# Patient Record
Sex: MALE | Race: WHITE | NOT HISPANIC OR LATINO | URBAN - METROPOLITAN AREA
[De-identification: names, ages, dates, MRNs, and addresses within clinical notes are randomized per-mention and may not be internally consistent; named-entity substitution may affect disease eponyms.]

---

## 2021-08-01 ENCOUNTER — EMERGENCY (EMERGENCY)
Facility: HOSPITAL | Age: 31
LOS: 1 days | Discharge: HOME | End: 2021-08-01
Attending: STUDENT IN AN ORGANIZED HEALTH CARE EDUCATION/TRAINING PROGRAM | Admitting: STUDENT IN AN ORGANIZED HEALTH CARE EDUCATION/TRAINING PROGRAM
Payer: COMMERCIAL

## 2021-08-01 VITALS
SYSTOLIC BLOOD PRESSURE: 145 MMHG | HEIGHT: 68 IN | RESPIRATION RATE: 17 BRPM | OXYGEN SATURATION: 95 % | DIASTOLIC BLOOD PRESSURE: 90 MMHG | WEIGHT: 190.04 LBS | HEART RATE: 99 BPM | TEMPERATURE: 98 F

## 2021-08-01 PROCEDURE — 99284 EMERGENCY DEPT VISIT MOD MDM: CPT

## 2021-08-01 PROCEDURE — 99283 EMERGENCY DEPT VISIT LOW MDM: CPT | Mod: 25

## 2021-08-01 PROCEDURE — 70360 X-RAY EXAM OF NECK: CPT | Mod: 26

## 2021-08-01 PROCEDURE — 70360 X-RAY EXAM OF NECK: CPT

## 2021-08-01 RX ORDER — DEXAMETHASONE 0.5 MG/5ML
10 ELIXIR ORAL ONCE
Refills: 0 | Status: COMPLETED | OUTPATIENT
Start: 2021-08-01 | End: 2021-08-01

## 2021-08-01 RX ADMIN — Medication 10 MILLIGRAM(S): at 18:53

## 2021-08-01 NOTE — ED PROVIDER NOTE - ATTENDING CONTRIBUTION TO CARE
Dr. Rene ENG: I performed a face to face bedside interview with patient regarding history of present illness, review of symptoms and past medical history. I completed an independent physical exam.  I have discussed patient's plan of care with PA.   I agree with note as stated above, having amended the EMR as needed to reflect my findings.   This includes HISTORY OF PRESENT ILLNESS, HIV, PAST MEDICAL/SURGICAL/FAMILY/SOCIAL HISTORY, ALLERGIES AND HOME MEDICATIONS, REVIEW OF SYSTEMS, PHYSICAL EXAM, and any PROGRESS NOTES during the time I functioned as the attending physician for this patient.     presenting with sore throat x 1 day, no muffled voice, no fevers, no vomiting, no drooling, tolerating secretions, no neck pain, no trauma, no surgical hx. denies chest pain, shortness of breath, abdominal pain, fb sensation.  VITAL SIGNS: I have reviewed nursing notes and confirm.   GEN: Well-developed; well-nourished; in no acute distress. Speaking full sentences.  SKIN: Warm, pink, no rash, no diaphoresis, no cyanosis, well perfused.   HEAD: Normocephalic; atraumatic. No scalp lacerations, no abrasions.  NECK: Supple; non tender.   EYES: Pupils 3mm equal, round, reactive to light and accomodation, conjunctiva and sclera clear. Extra-ocular movements intact bilaterally.  ENT: No nasal discharge; airway clear. Trachea is midline. NO STRIDOR. ORAL: No oropharyngeal exudates; mild posterior pharynx erythema but uvula midline, no tolsimllar abscess. Normal dentition.  CV: Regular rate and rhythm. S1, S2 normal; no murmurs, gallops, or rubs. No lower extremity pitting edema bilaterally. Capillary refill < 2 seconds throughout. Distal pulses intact 2+ throughout.  RESP: CTA bilaterally. No wheezes, rales, or rhonchi.   ABD: Normal bowel sounds, soft, non-distended, non-tender, no hepatosplenomegaly. No CVA tenderness bilaterally.  MSK: Normal range of motion and movement of all 4 extremities. No joint or muscular pain throughout. No clubbing.   BACK: No thoracolumbar midline or paravertebral tenderness. No step-offs or obvious deformities.  NEURO: Alert & oriented x 3, Grossly unremarkable. Sensory and motor intact throughout. No focal deficits. Gait: Fluid. Normal speech and coordination.   PSYCH: Cooperative, appropriate.   plan: laryngitis/pharyngitis, symptomatic care, decadron/otc cough drops/honey tea. f/u pmd, return precautions.

## 2021-08-01 NOTE — ED PROVIDER NOTE - OBJECTIVE STATEMENT
pt 30 yo m p/w sudden onset of feeling like there is mucous in his throat and hoarse voice. no actual pain. no drooling. able to eat and drink normally. no neck pain  denies dry cough, fever, chills, sore throat, diffuse body aches, rash, abd pain, diarrhea, n/v, headache, vision changes, SOB, CP, difficulty tolerating PO  no covid + contacts

## 2021-08-01 NOTE — ED PROVIDER NOTE - NSFOLLOWUPINSTRUCTIONS_ED_ALL_ED_FT
Laryngitis    WHAT YOU NEED TO KNOW:    What is laryngitis? Laryngitis is inflammation of your larynx (voice box). The larynx holds your vocal cords. Your vocal cords usually open and close easily to form sounds. With laryngitis, your vocal cords swell and become irritated. This may change how your voice sounds, or you may lose your voice for a short while.    What causes laryngitis?   •Overuse of voice from shouting, singing loudly, or speaking loudly      •Irritation from smoke, pollution, chemicals, or pollen      •Infection with a virus, bacteria, or fungus      •Certain medical conditions, such as acid reflux      What are the signs and symptoms of laryngitis?   •A weak voice or loss of voice      •Hoarse, raspy voice      •Sore, dry, raw throat      •Clearing your throat often      •Dry cough      How is laryngitis diagnosed? Your healthcare provider may ask about your symptoms and examine your throat. He or she may use a light and tiny mirror to see your vocal cords. Instead, a thin, flexible tube may be inserted through your mouth to the back of your throat. This is called a laryngoscopy. Sometimes, a small sample of tissue from the area is needed. This is called a biopsy. The tissue sample is sent to a lab for tests.    How is laryngitis treated? Laryngitis usually gets better on its own within 1 to 2 weeks. Medicines such as steroids or antibiotics may be used in some cases.    How can I manage my symptoms?   •Rest your voice. Try to talk as little as possible.      •Use a cool mist humidifier to increase air moisture in your home. This may soothe your throat and decrease your cough.      •Keep your mouth and throat moist. Suck on a throat lozenge or chew sugarless gum.      •Do not smoke, and avoid secondhand smoke. Nicotine and other chemicals in cigarettes and cigars can cause dryness and irritation in your throat and vocal cords. Ask your healthcare provider for information if you currently smoke and need help to quit. E-cigarettes or smokeless tobacco still contain nicotine. Talk to your healthcare provider before you use these products.      •Drink liquids as directed. You may need to drink extra liquids to help soothe your throat. Water or warm tea are good liquids to drink.      •Avoid spicy and acidic foods. These may irritate your throat. Examples include citrus, salad dressings, and hot sauces. Carbonated drinks may also cause discomfort in your throat.      •Try not to clear your throat. This can cause more irritation and swelling of your vocal cords.      Call your local emergency number (911 in the [place]) if:   •You have sudden trouble breathing.      •You have severe drooling or trouble swallowing.      When should I seek immediate care?   •You cough up blood.      •You have severe pain.      When should I call my doctor?   •Your symptoms last longer than 2 weeks.      •You have questions or concerns about your condition or care.      CARE AGREEMENT:    You have the right to help plan your care. Learn about your health condition and how it may be treated. Discuss treatment options with your healthcare providers to decide what care you want to receive. You always have the right to refuse treatment.

## 2021-08-01 NOTE — ED PROVIDER NOTE - CARE PROVIDER_API CALL
Kulwant Solis)  Otolaryngology  [address] [place] [address]  Phone: [phone]  Fax: [phone]  Follow Up Time:

## 2021-08-01 NOTE — ED PROVIDER NOTE - CLINICAL SUMMARY MEDICAL DECISION MAKING FREE TEXT BOX
pt 32 yo m p/w sudden onset of feeling like there is mucous in his throat and hoarse voice. no actual pain. no drooling. able to eat and drink normally. no neck pain  denies dry cough, fever, chills, sore throat, diffuse body aches, rash, abd pain, diarrhea, n/v, headache, vision changes, SOB, CP, difficulty tolerating PO  no covid + contacts  decadron, xray neg, fu pcp/ent

## 2021-08-01 NOTE — ED PROVIDER NOTE - PHYSICAL EXAMINATION
General:     NAD, well-nourished, well-appearing  Eyes: PERRL  Head:     NC/AT, EOMI, oral mucosa moist, pharynx clear no swelling or vesicles  Neck:     trachea midline, no tenderness, FROM, no meningismus   Lungs:     CTA b/l  CVS:     RRR  Abd:     +BS, s/nt/nd  Ext:   no deformities   Neuro: AAOx3

## 2021-08-01 NOTE — ED ADULT NURSE NOTE - NSIMPLEMENTINTERV_GEN_ALL_ED
Implemented All Universal Safety Interventions:  [place] to call system. Call bell, personal items and telephone within reach. Instruct patient to call for assistance. Room bathroom lighting operational. Non-slip footwear when patient is off stretcher. Physically safe environment: no spills, clutter or unnecessary equipment. Stretcher in lowest position, wheels locked, appropriate side rails in place.

## 2021-08-01 NOTE — ED PROVIDER NOTE - PATIENT PORTAL LINK FT
You can access the FollowMyHealth Patient Portal offered by [facility] by registering at the following website: [email or web address]. By joining [employer]’s FollowMyHealth portal, you will also be able to view your health information using other applications (apps) compatible with our system.

## 2022-08-20 ENCOUNTER — EMERGENCY (EMERGENCY)
Facility: HOSPITAL | Age: 32
LOS: 1 days | Discharge: HOME | End: 2022-08-20
Attending: EMERGENCY MEDICINE | Admitting: EMERGENCY MEDICINE
Payer: COMMERCIAL

## 2022-08-20 VITALS
HEART RATE: 98 BPM | TEMPERATURE: 98 F | OXYGEN SATURATION: 95 % | SYSTOLIC BLOOD PRESSURE: 126 MMHG | HEIGHT: 68 IN | RESPIRATION RATE: 16 BRPM | DIASTOLIC BLOOD PRESSURE: 92 MMHG

## 2022-08-20 VITALS
SYSTOLIC BLOOD PRESSURE: 124 MMHG | RESPIRATION RATE: 17 BRPM | OXYGEN SATURATION: 98 % | DIASTOLIC BLOOD PRESSURE: 86 MMHG | HEART RATE: 79 BPM

## 2022-08-20 PROCEDURE — 73080 X-RAY EXAM OF ELBOW: CPT | Mod: 26,RT

## 2022-08-20 PROCEDURE — 99284 EMERGENCY DEPT VISIT MOD MDM: CPT

## 2022-08-20 PROCEDURE — 73080 X-RAY EXAM OF ELBOW: CPT

## 2022-08-20 PROCEDURE — 73090 X-RAY EXAM OF FOREARM: CPT | Mod: 26,LT

## 2022-08-20 PROCEDURE — 73090 X-RAY EXAM OF FOREARM: CPT

## 2022-08-20 RX ORDER — IBUPROFEN 200 MG
600 TABLET ORAL ONCE
Refills: 0 | Status: COMPLETED | OUTPATIENT
Start: 2022-08-20 | End: 2022-08-20

## 2022-08-20 RX ADMIN — Medication 600 MILLIGRAM(S): at 22:25

## 2022-08-20 NOTE — ED PROVIDER NOTE - OBJECTIVE STATEMENT
33 y/o M with no PMHx p/w c/o right elbow pain with inability to fully extend and flex 31 y/o M with no PMHx p/w c/o right elbow pain with inability to fully extend and flex s/p fall with arm tucked behind him.  Pt denies head injury or LOC.  No neck or back pain. Pt ambulatory into the ED and in NAD.  He has no CP or SOB.

## 2022-08-20 NOTE — ED PROVIDER NOTE - NSFOLLOWUPINSTRUCTIONS_ED_ALL_ED_FT
Patient Name: MISTY GRAHAM  Caregiver: Bodega Toney KIRSTEN  Elbow Sprain     Take Ibuprofen 400 mg every 6 to 8 hours for pain and swelling  An elbow sprain is an injury to one of the strong bands of tissue (ligaments) that connect the bones of the elbow. Ligaments connect the three bones that make up the elbow joint. This injury usually occurs on the inside of the elbow and rarely on the outside. There are three types of elbow sprains:    A grade 1 sprain is a stretching of a ligament. This injury causes minor pain and swelling, but the joint remains stable.  A grade 2 sprain is a partial ligament tear. This injury may cause moderate pain and swelling, and a looseness in the joint that causes it to move more than normal (laxity).  A grade 3 sprain is a complete ligament tear. This injury will cause severe pain and swelling, and the joint will become unstable.    What are the causes?  This condition may be caused by:    A sudden injury (acute sprain). This may result from falling on an outstretched arm or severely twisting or straining your elbow joint.  An overuse injury. This injury occurs gradually over time from doing activities that involve the same elbow movements over and over again. This type of injury is common in activities that require overhand throwing.    What are the signs or symptoms?  Symptoms of this condition include:    Pain.  Pain that gets worse with elbow movement.  Swelling.  Bruising.  Stiffness of the elbow joint.  Laxity of the elbow joint.  Inability to use the elbow joint.  Tingling or numbness.  Hearing a pop or feeling a tear at the time of the injury.    How is this diagnosed?  This condition may be diagnosed based on:    Your symptoms and history of an injury or activity that puts stress on the elbow.  A physical exam. The health care provider will check the movement and stability of your elbow.  Imaging tests, such as an X-ray or MRI. These tests can show how severe the sprain is and can be used to rule out a broken bone or stress fracture.    How is this treated?  At first, this condition may be treated by protecting, resting, icing, applying pressure (compression), and raising (elevating) the injured elbow above the level of your heart. This is known as PRICE therapy. Your health care provider may also recommend taking a nonsteroidal anti-inflammatory drug (NSAID) to reduce pain and swelling.    Additional treatment depends on the severity of the sprain.    A grade 1 sprain may need only PRICE therapy.  A grade 2 sprain may be treated with PRICE therapy and an elbow brace.  A grade 3 sprain usually requires surgery to repair the ligament.    You may also need to do certain exercises to maintain full movement and to strengthen the muscles of the shoulder, forearm, and elbow (physical therapy).    Follow these instructions at home:      If you have a brace:    Wear the brace as told by your health care provider. Remove it only as told by your health care provider.  Loosen the brace if your fingers tingle, become numb, or turn cold and blue.  Keep the brace clean.  If the brace is not waterproof:    Do not let it get wet.  Cover it with a watertight covering when you take a bath or shower.        Managing pain, stiffness, and swelling     If directed, put ice on your elbow:    Put ice in a plastic bag.  Place a towel between your skin and the bag.  Leave the ice on for 20 minutes, 2–3 times a day.  Move your fingers often to reduce stiffness and swelling.  Elevate your elbow above the level of your heart while you are sitting or lying down.        Activity    Avoid activities that cause elbow pain.   Return to your normal activities as told by your health care provider. Ask your health care provider what activities are safe for you.  Ask your health care provider when it is safe to drive if you have an elbow brace.  Do exercises as told by your health care provider.        General instructions    Wear an elastic bandage or compression wrap only as told by your health care provider.  Take over-the-counter and prescription medicines only as told by your health care provider.  Do not use any products that contain nicotine or tobacco, such as cigarettes, e-cigarettes, and chewing tobacco. These can delay healing. If you need help quitting, ask your health care provider.  Keep all follow-up visits as told by your health care provider. This is important.    Contact a health care provider if:  Your symptoms get worse.  You develop new symptoms.  Your symptoms have not improved with home care after two weeks.    Get help right away if:  You have severe pain.  Your fingers turn white, very red, or cold and blue.    Summary  An elbow sprain is an injury to a ligament in the elbow.  An elbow sprain can be from an acute injury or repetitive stress.  Symptoms include pain, swelling, loss of movement, and bruising.  At first, this condition may be treated by protecting, resting, icing, applying pressure (compression), and elevating the injured elbow above the level of your heart. This is known as PRICE therapy.   Other treatments depend on the severity of the sprain.    ADDITIONAL NOTES AND INSTRUCTIONS    Please follow up with your Primary MD in 24-48 hr.  Seek immediate medical care for any new/worsening signs or symptoms.

## 2022-08-20 NOTE — ED ADULT NURSE NOTE - OBJECTIVE STATEMENT
Pt came from home s/p fall c/o right arm pain/injury. Pt states that about 3 hrs ago he fell down 1 step and landed on his right arm. Pt states that he is unable to fully extend or flex his right arm. Pt states that the pain is 10/10. Pt denies any numbness or tingling in the right arm. No PMH.

## 2022-08-20 NOTE — ED ADULT NURSE NOTE - NSIMPLEMENTINTERV_GEN_ALL_ED
Implemented All Fall with Harm Risk Interventions:  [place] to call system. Call bell, personal items and telephone within reach. Instruct patient to call for assistance. Room bathroom lighting operational. Non-slip footwear when patient is off stretcher. Physically safe environment: no spills, clutter or unnecessary equipment. Stretcher in lowest position, wheels locked, appropriate side rails in place. Provide visual cue, wrist band, yellow gown, etc. Monitor gait and stability. Monitor for mental status changes and reorient to person, place, and time. Review medications for side effects contributing to fall risk. Reinforce activity limits and safety measures with patient and family. Provide visual clues: red socks.

## 2022-08-20 NOTE — ED PROVIDER NOTE - PATIENT PORTAL LINK FT
You can access the FollowMyHealth Patient Portal offered by [facility] by registering at the following website: [email or web address]. By joining [employer]’s FollowMyHealth portal, you will also be able to view your health information using other applications (apps) compatible with our system.

## 2022-08-21 PROBLEM — Z78.9 OTHER SPECIFIED HEALTH STATUS: Chronic | Status: ACTIVE | Noted: 2021-08-01

## 2022-08-21 NOTE — ED POST DISCHARGE NOTE - DETAILS
pt informed of the results, he was given an arm sling to use. Pt will f/u with his orthopedic specialist

## 2023-08-15 ENCOUNTER — EMERGENCY (EMERGENCY)
Facility: HOSPITAL | Age: 33
LOS: 1 days | Discharge: ANOTHER HOSPITAL | End: 2023-08-15
Attending: EMERGENCY MEDICINE | Admitting: EMERGENCY MEDICINE
Payer: COMMERCIAL

## 2023-08-15 VITALS
HEART RATE: 83 BPM | SYSTOLIC BLOOD PRESSURE: 158 MMHG | RESPIRATION RATE: 18 BRPM | DIASTOLIC BLOOD PRESSURE: 117 MMHG | OXYGEN SATURATION: 97 % | TEMPERATURE: 98 F | HEIGHT: 68 IN | WEIGHT: 182.1 LBS

## 2023-08-15 LAB
ALBUMIN SERPL ELPH-MCNC: 4.2 G/DL — SIGNIFICANT CHANGE UP (ref 3.3–5)
ALP SERPL-CCNC: 67 U/L — SIGNIFICANT CHANGE UP (ref 40–120)
ALT FLD-CCNC: 53 U/L — HIGH (ref 10–45)
ANION GAP SERPL CALC-SCNC: 8 MMOL/L — SIGNIFICANT CHANGE UP (ref 5–17)
APAP SERPL-MCNC: <1 UG/ML — LOW (ref 10–30)
APPEARANCE UR: ABNORMAL
AST SERPL-CCNC: 31 U/L — SIGNIFICANT CHANGE UP (ref 10–40)
BASOPHILS # BLD AUTO: 0.03 K/UL — SIGNIFICANT CHANGE UP (ref 0–0.2)
BASOPHILS NFR BLD AUTO: 0.3 % — SIGNIFICANT CHANGE UP (ref 0–2)
BILIRUB SERPL-MCNC: 0.8 MG/DL — SIGNIFICANT CHANGE UP (ref 0.2–1.2)
BILIRUB UR-MCNC: NEGATIVE — SIGNIFICANT CHANGE UP
BUN SERPL-MCNC: 16 MG/DL — SIGNIFICANT CHANGE UP (ref 7–23)
CALCIUM SERPL-MCNC: 9.6 MG/DL — SIGNIFICANT CHANGE UP (ref 8.4–10.5)
CHLORIDE SERPL-SCNC: 103 MMOL/L — SIGNIFICANT CHANGE UP (ref 96–108)
CO2 SERPL-SCNC: 29 MMOL/L — SIGNIFICANT CHANGE UP (ref 22–31)
COLOR SPEC: YELLOW — SIGNIFICANT CHANGE UP
CREAT SERPL-MCNC: 1.03 MG/DL — SIGNIFICANT CHANGE UP (ref 0.5–1.3)
DIFF PNL FLD: NEGATIVE — SIGNIFICANT CHANGE UP
EGFR: 98 ML/MIN/1.73M2 — SIGNIFICANT CHANGE UP
EOSINOPHIL # BLD AUTO: 0.02 K/UL — SIGNIFICANT CHANGE UP (ref 0–0.5)
EOSINOPHIL NFR BLD AUTO: 0.2 % — SIGNIFICANT CHANGE UP (ref 0–6)
ETHANOL SERPL-MCNC: <3 MG/DL — SIGNIFICANT CHANGE UP (ref 0–3)
GLUCOSE SERPL-MCNC: 92 MG/DL — SIGNIFICANT CHANGE UP (ref 70–99)
GLUCOSE UR QL: NEGATIVE MG/DL — SIGNIFICANT CHANGE UP
HCT VFR BLD CALC: 50.4 % — HIGH (ref 39–50)
HGB BLD-MCNC: 17.6 G/DL — HIGH (ref 13–17)
IMM GRANULOCYTES NFR BLD AUTO: 0.1 % — SIGNIFICANT CHANGE UP (ref 0–0.9)
KETONES UR-MCNC: NEGATIVE MG/DL — SIGNIFICANT CHANGE UP
LEUKOCYTE ESTERASE UR-ACNC: NEGATIVE — SIGNIFICANT CHANGE UP
LYMPHOCYTES # BLD AUTO: 1.21 K/UL — SIGNIFICANT CHANGE UP (ref 1–3.3)
LYMPHOCYTES # BLD AUTO: 13.5 % — SIGNIFICANT CHANGE UP (ref 13–44)
MCHC RBC-ENTMCNC: 31 PG — SIGNIFICANT CHANGE UP (ref 27–34)
MCHC RBC-ENTMCNC: 34.9 GM/DL — SIGNIFICANT CHANGE UP (ref 32–36)
MCV RBC AUTO: 88.7 FL — SIGNIFICANT CHANGE UP (ref 80–100)
MONOCYTES # BLD AUTO: 0.51 K/UL — SIGNIFICANT CHANGE UP (ref 0–0.9)
MONOCYTES NFR BLD AUTO: 5.7 % — SIGNIFICANT CHANGE UP (ref 2–14)
NEUTROPHILS # BLD AUTO: 7.18 K/UL — SIGNIFICANT CHANGE UP (ref 1.8–7.4)
NEUTROPHILS NFR BLD AUTO: 80.2 % — HIGH (ref 43–77)
NITRITE UR-MCNC: NEGATIVE — SIGNIFICANT CHANGE UP
NRBC # BLD: 0 /100 WBCS — SIGNIFICANT CHANGE UP (ref 0–0)
PCP SPEC-MCNC: SIGNIFICANT CHANGE UP
PH UR: 7.5 — SIGNIFICANT CHANGE UP (ref 5–8)
PLATELET # BLD AUTO: 208 K/UL — SIGNIFICANT CHANGE UP (ref 150–400)
POTASSIUM SERPL-MCNC: 4.1 MMOL/L — SIGNIFICANT CHANGE UP (ref 3.5–5.3)
POTASSIUM SERPL-SCNC: 4.1 MMOL/L — SIGNIFICANT CHANGE UP (ref 3.5–5.3)
PROT SERPL-MCNC: 7.4 G/DL — SIGNIFICANT CHANGE UP (ref 6–8.3)
PROT UR-MCNC: NEGATIVE MG/DL — SIGNIFICANT CHANGE UP
RBC # BLD: 5.68 M/UL — SIGNIFICANT CHANGE UP (ref 4.2–5.8)
RBC # FLD: 11.5 % — SIGNIFICANT CHANGE UP (ref 10.3–14.5)
SALICYLATES SERPL-MCNC: 1.5 MG/DL — LOW (ref 3–30)
SARS-COV-2 RNA SPEC QL NAA+PROBE: SIGNIFICANT CHANGE UP
SODIUM SERPL-SCNC: 140 MMOL/L — SIGNIFICANT CHANGE UP (ref 135–145)
SP GR SPEC: 1.01 — SIGNIFICANT CHANGE UP (ref 1–1.03)
TSH SERPL-MCNC: 2.59 UIU/ML — SIGNIFICANT CHANGE UP (ref 0.36–3.74)
UROBILINOGEN FLD QL: 0.2 MG/DL — SIGNIFICANT CHANGE UP (ref 0.2–1)
WBC # BLD: 8.96 K/UL — SIGNIFICANT CHANGE UP (ref 3.8–10.5)
WBC # FLD AUTO: 8.96 K/UL — SIGNIFICANT CHANGE UP (ref 3.8–10.5)

## 2023-08-15 PROCEDURE — 93010 ELECTROCARDIOGRAM REPORT: CPT

## 2023-08-15 PROCEDURE — 99285 EMERGENCY DEPT VISIT HI MDM: CPT

## 2023-08-15 PROCEDURE — 90792 PSYCH DIAG EVAL W/MED SRVCS: CPT | Mod: 95

## 2023-08-15 NOTE — ED PROVIDER NOTE - PHYSICAL EXAMINATION
exam:   General: well appearing, NAD.   HEENT: eyes perrl, nose normal, OP no erythema/exudate/swelling.   cor: RRR, s1s2, 2+rad pulses.   lungs: ctabl, no resp distress.   abd: soft, ntnd.   neuro: a&ox3, cn2-12 intact, FREEMAN, 5/5 strength c nl sensation all extremities, nl coordination.   MSK: no extremity swelling.  Skin: normal, no rash  psych: calm cooperative. currently denies SI/HI/hallucinations

## 2023-08-15 NOTE — ED ADULT TRIAGE NOTE - CHIEF COMPLAINT QUOTE
Pt BIBA with SI. Pt texted his mother he was going to jump off the roof. PT states he was just acting out and does not feel Suicidal.

## 2023-08-15 NOTE — ED PROVIDER NOTE - OBJECTIVE STATEMENT
33-year-old male brought in by EMS for suicidal ideation.  Patient states he had a "bad day", had a "meltdown".  Patient reported threatening to jump off a roof to kill himself.  Patient noted that he did not actually plan to kill himself, states that he was trying to get a reaction from his mother.  Patient denies any physical complaints.  No HI/hallucinations.  Patient states he just had his Zyprexa increased from 5 mg to 10 mg yesterday.  Denies any alcohol or illegal drug use today 33-year-old male h/o schizophrenia and bipolar disorder brought in by EMS for suicidal ideation.  Patient states he had a "bad day", had a "meltdown".  Patient reported threatening to jump off a roof to kill himself.  Patient noted that he did not actually plan to kill himself, states that he was trying to get a reaction from his mother.  Patient denies any physical complaints.  No HI/hallucinations.  Patient states he just had his Zyprexa increased from 5 mg to 10 mg yesterday.  Denies any alcohol or illegal drug use today    [facility] [place] Patti arias: [phone]  psychiatrist Dr Ra Aleman [phone] cell  psychologist Dr Wilde  [phone] cell
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
## 2023-08-15 NOTE — ED ADULT TRIAGE NOTE - NS_BH TRG QUESTION4_ED_ALL_ED
Yes Treatment Number (Will Not Render If 0): 0 Consent: The patient's consent was obtained including but not limited to risks of crusting, scabbing, blistering, scarring, darker or lighter pigmentary change, recurrence, incomplete removal and infection. Medical Necessity Clause: This procedure was medically necessary because the lesions that were treated were: Include Z78.9 (Other Specified Conditions Influencing Health Status) As An Associated Diagnosis?: No Anesthesia Volume In Cc: 2 Post-Care Instructions: I reviewed with the patient in detail post-care instructions. Patient is to wear sunprotection, and avoid picking at any of the treated lesions. Pt may apply Vaseline to crusted or scabbing areas. Medical Necessity Information: It is in your best interest to select a reason for this procedure from the list below. All of these items fulfill various CMS LCD requirements except the new and changing color options. Detail Level: Detailed

## 2023-08-15 NOTE — ED PROVIDER NOTE - CLINICAL SUMMARY MEDICAL DECISION MAKING FREE TEXT BOX
33-year-old male brought in by EMS for suicidal ideation.  Patient states he had a "bad day", had a "meltdown".  Patient reported threatening to jump off a roof to kill himself.  Patient noted that he did not actually plan to kill himself, states that he was trying to get a reaction from his mother.  Patient denies any physical complaints.  No HI/hallucinations.  Patient states he just had his Zyprexa increased from 5 mg to 10 mg yesterday.  Denies any alcohol or illegal drug use today      Patient currently calm cooperative.  Will do medical work-up for medical clearance.  Telepsych evaluation 33-year-old male h/o schizophrenia and bipolar disorder  brought in by EMS for suicidal ideation.  Patient states he had a "bad day", had a "meltdown".  Patient reported threatening to jump off a roof to kill himself.  Patient noted that he did not actually plan to kill himself, states that he was trying to get a reaction from his mother.  Patient denies any physical complaints.  No HI/hallucinations.  Patient states he just had his Zyprexa increased from 5 mg to 10 mg yesterday.  Denies any alcohol or illegal drug use today      Patient currently calm cooperative.  Will do medical work-up for medical clearance.  Telepsych evaluation 33-year-old male h/o schizophrenia and bipolar disorder  brought in by EMS for suicidal ideation.  Patient states he had a "bad day", had a "meltdown".  Patient reported threatening to jump off a roof to kill himself.  Patient noted that he did not actually plan to kill himself, states that he was trying to get a reaction from his mother.  Patient denies any physical complaints.  No HI/hallucinations.  Patient states he just had his Zyprexa increased from 5 mg to 10 mg yesterday.  Denies any alcohol or illegal drug use today      Patient currently calm cooperative.  Will do medical work-up for medical clearance.  Telepsych evaluation    0100: labs, ekg , unremarkable. pt reportedly punched a wall with L hand few days ago. L hand appears normal.  No focal tenderness or swelling.  Full range of motion wrist hand fingers.  No suspicion for fracture at this time.  No indication for x-ray.pt medically cleared for psych admission.     Patient seen by telepsych recommending involuntary admission at this time.  Awaiting psych bed availability 33-year-old male h/o schizophrenia and bipolar disorder  brought in by EMS for suicidal ideation.  Patient states he had a "bad day", had a "meltdown".  Patient reported threatening to jump off a roof to kill himself.  Patient noted that he did not actually plan to kill himself, states that he was trying to get a reaction from his mother.  Patient denies any physical complaints.  No HI/hallucinations.  Patient states he just had his Zyprexa increased from 5 mg to 10 mg yesterday.  Denies any alcohol or illegal drug use today      Patient currently calm cooperative.  Will do medical work-up for medical clearance.  Telepsych evaluation    0100: labs, ekg , unremarkable. pt reportedly punched a wall with L hand few days ago. L hand appears normal.  No focal tenderness or swelling.  Full range of motion wrist hand fingers.  No suspicion for fracture at this time.  No indication for x-ray.pt medically cleared for psych admission.     Patient seen by telepsych recommending involuntary admission at this time.  Awaiting psych bed availability    Reji: Accepted to [facility] Dr Juarez

## 2023-08-15 NOTE — ED BEHAVIORAL HEALTH ASSESSMENT NOTE - DIFFERENTIAL
Adjustment disorder with mixed features vs SIMD vs Bipolar disorder, mixed episode  Cannabis use disorder

## 2023-08-15 NOTE — ED BEHAVIORAL HEALTH NOTE - BEHAVIORAL HEALTH NOTE
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 in the past. Pt punched a wall 3 days ago.     SUBSTANCE: daily marijuana use reported.      ==============    OTHER HISTORY    ==============    CURRENT MEDICATION: unknown to collateral. Collateral recommended referring to pt's outpatient psychiatrist Dr. Slater, [phone].     MEDICAL HISTORY: no PMH reported.        ALLERGIES: no known allergies.      LEGAL ISSUES: no legal hx reported.      FIREARM ACCESS: no known access to firearms.      SOCIAL HISTORY: collateral reports that pt has been in mental health treatment since the age of 16.     FAMILY HISTORY: collateral denies.      DEVELOPMENTAL HISTORY: collateral denies.

## 2023-08-15 NOTE — ED ADULT NURSE NOTE - NSFALLUNIVINTERV_ED_ALL_ED
Bed/Stretcher in lowest position, wheels locked, appropriate side rails in place/Call bell, personal items and telephone in reach/Instruct patient to call for assistance before getting out of bed/chair/stretcher/Non-slip footwear applied when patient is off stretcher/[place] to call system/Physically safe environment - no spills, clutter or unnecessary equipment/Purposeful proactive rounding/Room/bathroom lighting operational, light cord in reach

## 2023-08-15 NOTE — ED PROVIDER NOTE - NSICDXNOPASTMEDICALHX_GEN_ALL_ED
----- Message from Alicia Nunez sent at 6/7/2022 11:32 AM CDT -----  Type:  Needs Medical Advice    Who Called: pt sister  Symptoms (please be specific): they have questions concerning  instructions for therapy    Would the patient rather a call back or a response via [facility]? Please call  Best Call Back Number: [phone]  Additional Information:       The patient sister would like to speak with you  Elena Huang -    Regarding his discharge form therapy ant the [facility]  OT  PT     He is still not getting therapy and was told she should take him to outpatient therapy  She would like him to continue therapy at the [facility]  Can you please help with this?  
<-- Click to add NO pertinent Past Medical History

## 2023-08-16 DIAGNOSIS — F32.A DEPRESSION, UNSPECIFIED: ICD-10-CM

## 2023-08-16 PROCEDURE — 99215 OFFICE O/P EST HI 40 MIN: CPT | Mod: 95

## 2023-08-16 RX ORDER — OLANZAPINE 15 MG/1
10 TABLET, FILM COATED ORAL ONCE
Refills: 0 | Status: COMPLETED | OUTPATIENT
Start: 2023-08-16 | End: 2023-08-16

## 2023-08-16 RX ORDER — FLUOXETINE HCL 10 MG
20 CAPSULE ORAL EVERY 24 HOURS
Refills: 0 | Status: DISCONTINUED | OUTPATIENT
Start: 2023-08-16 | End: 2023-08-19

## 2023-08-16 RX ADMIN — Medication 20 MILLIGRAM(S): at 08:51

## 2023-08-16 RX ADMIN — OLANZAPINE 10 MILLIGRAM(S): 15 TABLET, FILM COATED ORAL at 01:23

## 2023-08-16 RX ADMIN — OLANZAPINE 10 MILLIGRAM(S): 15 TABLET, FILM COATED ORAL at 20:19

## 2023-08-16 NOTE — ED BEHAVIORAL HEALTH PROGRESS NOTE - CASE SUMMARY/FORMULATION (CLEARLY DOCUMENT RATIONALE FOR DISPOSITION CHANGE)
34 yo M who is at acutely elevated risk of harm to self and others due to psychiatric decompensation with notable increase in verbal threats of violence and punching a wall, impulsive behavior of attempting to climb a ladder and threaten to jump off the roof that is not consistent with baseline behavior per collateral who have significant safety concerns for patient.    Plan:  -Tranfer to inpt psych pending bed availability  -Maintain on 1:1 in the ED  -Restart Zyprexa 10 mg qHS and Fluoxetine 20 mg daily  -Give Ativan 1 mg Q6H PRN anxiety or agitation (restlessness, irritability)  -For agitation, can start Haldol 5 mg/Ativan 2 mg q6H PRN. If refusing PO and is in acute risk of harm can escalate to IM. If given, obtain repeat ecg and hold antipsychotics if QTC>500 32 yo M who is at acutely elevated risk of harm to self and others with notable increase in verbal threats of violence and punching a wall, impulsive behavior of attempting to climb a ladder and threaten to jump off the roof that is not consistent with baseline behavior per collateral who have significant safety concerns for patient. Signs and symptoms (irritability, impulsivity, anxious distress, aggression, SI/HI) are most consistent with a mixed episode given diagnosis of Bipolar disorder vs Schizoaffective. On assessment, patient is expressing insight regarding recent behavior and is willing to sign in voluntarily to the hospital though he currently meets criteria for involuntary due to impulsivity and suicidal behavior as described above.    Plan:  -Tranfer to inpt psych pending bed availability  -Maintain on 1:1 in the ED  -Restart Zyprexa 10 mg qHS and Fluoxetine 20 mg daily  -Give Ativan 1 mg Q6H PRN anxiety or agitation (restlessness, irritability)  -For agitation, can start Haldol 5 mg/Ativan 2 mg q6H PRN. If refusing PO and is in acute risk of harm can escalate to IM. If given, obtain repeat ecg and hold antipsychotics if QTC>500

## 2023-08-16 NOTE — ED ADULT NURSE REASSESSMENT NOTE - NS ED NURSE REASSESS COMMENT FT1
Assumed care of patient, pt sleeping at this time, respirations unlabored. 1:1 observation remains, PCA at bedside. Will continue to assess.
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
## 2023-08-16 NOTE — ED BEHAVIORAL HEALTH PROGRESS NOTE - DETAILS:
Patient reports he feels more calm, he got some sleep, says "I get it, no resistance, I understand what I did was wrong, people care about me" and says he wants to go to the hospital and get back to his family. He says "that's not me, I don't know why I've been so angry" regarding his behavior recently. He says he has no current SI or HI, he is worried about his cat and identifies his Nondenominational beliefs as a protective factor. He says he's looking forward to transitioning his work to be outside in nature "the weather affects my mood, the planet is in a good spot, at one with oneself in nature" says the "closed in feeling of defeat" is lessened. Answered questions and concerns regarding legal rights pertaining to psychiatric admission and what to expect.

## 2023-08-17 VITALS
DIASTOLIC BLOOD PRESSURE: 95 MMHG | HEART RATE: 78 BPM | RESPIRATION RATE: 18 BRPM | TEMPERATURE: 99 F | SYSTOLIC BLOOD PRESSURE: 130 MMHG | OXYGEN SATURATION: 97 %

## 2023-08-17 PROCEDURE — 80053 COMPREHEN METABOLIC PANEL: CPT

## 2023-08-17 PROCEDURE — 93005 ELECTROCARDIOGRAM TRACING: CPT

## 2023-08-17 PROCEDURE — 81001 URINALYSIS AUTO W/SCOPE: CPT

## 2023-08-17 PROCEDURE — 99285 EMERGENCY DEPT VISIT HI MDM: CPT | Mod: 25

## 2023-08-17 PROCEDURE — 84443 ASSAY THYROID STIM HORMONE: CPT

## 2023-08-17 PROCEDURE — 36415 COLL VENOUS BLD VENIPUNCTURE: CPT

## 2023-08-17 PROCEDURE — 93010 ELECTROCARDIOGRAM REPORT: CPT

## 2023-08-17 PROCEDURE — 85025 COMPLETE CBC W/AUTO DIFF WBC: CPT

## 2023-08-17 PROCEDURE — 87635 SARS-COV-2 COVID-19 AMP PRB: CPT

## 2023-08-17 PROCEDURE — 80307 DRUG TEST PRSMV CHEM ANLYZR: CPT

## 2023-08-17 RX ADMIN — Medication 20 MILLIGRAM(S): at 08:13

## 2023-08-17 NOTE — ED BEHAVIORAL HEALTH NOTE - BEHAVIORAL HEALTH NOTE
Per RN patient remains gowned, wanded, and in private room on 1:1 observation. No SI/HI/AH/VH elicited. Patient received Zyprexa 10mg PO. Patient observed to be sleeping overnight. RN notes mother stopped by earlier, patient presently unaccompanied by social supports while in ED. Patient pending psychiatric bed placement at this time.

## 2023-08-17 NOTE — ED ADULT NURSE REASSESSMENT NOTE - NS ED NURSE REASSESS COMMENT FT1
Environment checked for safety. Patient's belongings in ED closet. Patient awaiting bed. Constant observation continues.

## 2023-08-17 NOTE — ED ADULT NURSE REASSESSMENT NOTE - NS ED NURSE REASSESS COMMENT FT1
Called pharmacy to send scheduled Prozac 20 mg oral. Patient calm, sleeping, constant observation at bedside. 1:1 continues at all times. Safety maintained.

## 2023-08-17 NOTE — ED BEHAVIORAL HEALTH NOTE - BEHAVIORAL HEALTH NOTE
Case was reviewed and accepted to [facility] facility. Accepting MD: . Unit: TBD. [facility] will [facility] to [facility]. BTCM spoke to pt's mother (Arlette, [phone]) who is in agreement with pt getting transferred to [facility]. BTCM informed ED attending regarding where pt will be transferred to.

## 2023-08-18 NOTE — CHART NOTE - NSCHARTNOTEFT_GEN_A_CORE
SW informed by ED MD and CHRIS Germain from Tele-psychiatry that the patients family and psychologist are requesting transfer to [facility] [place] in [place].  Jessa informed SW they are awaiting to get approval from Medical Director that this facility is appropriate for a 2PC transfer.
Patient was transferred to [facility].  Authorization # [id number] commence 8/15 - 8/21.  To be reviewed by Monday 8/21.   is Noemy RILEY [phone] Ext. [phone].

## 2023-09-20 NOTE — ED PROVIDER NOTE - WR INTERPRETATION DATE TIME  1
20-Aug-2022 23:28 Calcipotriene Counseling:  I discussed with the patient the risks of calcipotriene including but not limited to erythema, scaling, itching, and irritation.

## 2024-10-24 NOTE — ED BEHAVIORAL HEALTH PROGRESS NOTE - TRANSFER ATTEMPTS TO INPATIENT BH SINCE LAST ASSESSMENT?
Detail Level: Detailed
Render Note In Bullet Format When Appropriate: No
Post-Care Instructions: I reviewed with the patient in detail post-care instructions. Patient is to wear sunprotection, and avoid picking at any of the treated lesions. Pt may apply Vaseline to crusted or scabbing areas.
Consent: The patient's consent was obtained including but not limited to risks of crusting, scabbing, blistering, scarring, darker or lighter pigmentary change, recurrence, incomplete removal and infection.
Show Applicator Variable?: Yes
Duration Of Freeze Thaw-Cycle (Seconds): 0
No